# Patient Record
Sex: FEMALE | Race: WHITE | Employment: OTHER | URBAN - METROPOLITAN AREA
[De-identification: names, ages, dates, MRNs, and addresses within clinical notes are randomized per-mention and may not be internally consistent; named-entity substitution may affect disease eponyms.]

---

## 2017-06-10 ENCOUNTER — HOSPITAL ENCOUNTER (OUTPATIENT)
Facility: HOSPITAL | Age: 82
Setting detail: OBSERVATION
Discharge: NURSING, REHAB OR LONG-TERM CARE | End: 2017-06-13
Attending: EMERGENCY MEDICINE | Admitting: HOSPITALIST
Payer: MEDICARE

## 2017-06-10 ENCOUNTER — APPOINTMENT (OUTPATIENT)
Dept: CT IMAGING | Facility: HOSPITAL | Age: 82
End: 2017-06-10
Attending: EMERGENCY MEDICINE
Payer: MEDICARE

## 2017-06-10 DIAGNOSIS — G45.9 TRANSIENT CEREBRAL ISCHEMIA, UNSPECIFIED TYPE: Primary | ICD-10-CM

## 2017-06-10 PROCEDURE — 70450 CT HEAD/BRAIN W/O DYE: CPT | Performed by: EMERGENCY MEDICINE

## 2017-06-10 RX ORDER — ESCITALOPRAM OXALATE 5 MG/1
5 TABLET ORAL DAILY
COMMUNITY
End: 2017-08-05

## 2017-06-10 RX ORDER — LEVOTHYROXINE SODIUM 0.07 MG/1
75 TABLET ORAL
COMMUNITY

## 2017-06-10 RX ORDER — RALOXIFENE HYDROCHLORIDE 60 MG/1
60 TABLET, FILM COATED ORAL DAILY
COMMUNITY
End: 2017-08-05

## 2017-06-10 RX ORDER — ASPIRIN 325 MG
325 TABLET ORAL EVERY 6 HOURS PRN
COMMUNITY

## 2017-06-10 RX ORDER — ZOLPIDEM TARTRATE 5 MG/1
5 TABLET ORAL NIGHTLY PRN
COMMUNITY
End: 2017-06-27

## 2017-06-10 RX ORDER — MONTELUKAST SODIUM 10 MG/1
10 TABLET ORAL NIGHTLY
COMMUNITY
End: 2017-08-28

## 2017-06-10 RX ORDER — LISINOPRIL 10 MG/1
10 TABLET ORAL DAILY
COMMUNITY
End: 2017-08-05

## 2017-06-10 RX ORDER — HYDROCODONE BITARTRATE AND ACETAMINOPHEN 5; 325 MG/1; MG/1
1 TABLET ORAL EVERY 6 HOURS PRN
Status: ON HOLD | COMMUNITY
End: 2017-06-13

## 2017-06-10 RX ORDER — SIMVASTATIN 20 MG
20 TABLET ORAL NIGHTLY
COMMUNITY
End: 2017-08-07

## 2017-06-10 RX ORDER — ENOXAPARIN SODIUM 150 MG/ML
40 INJECTION SUBCUTANEOUS DAILY
COMMUNITY
End: 2017-08-28

## 2017-06-10 RX ORDER — OMEPRAZOLE 20 MG/1
20 CAPSULE, DELAYED RELEASE ORAL
COMMUNITY
End: 2017-08-05

## 2017-06-11 ENCOUNTER — APPOINTMENT (OUTPATIENT)
Dept: CV DIAGNOSTICS | Facility: HOSPITAL | Age: 82
End: 2017-06-11
Attending: HOSPITALIST
Payer: MEDICARE

## 2017-06-11 ENCOUNTER — APPOINTMENT (OUTPATIENT)
Dept: MRI IMAGING | Facility: HOSPITAL | Age: 82
End: 2017-06-11
Attending: HOSPITALIST
Payer: MEDICARE

## 2017-06-11 PROBLEM — E78.5 HYPERLIPIDEMIA: Chronic | Status: ACTIVE | Noted: 2017-06-11

## 2017-06-11 PROBLEM — E87.1 HYPONATREMIA: Status: ACTIVE | Noted: 2017-06-11

## 2017-06-11 PROBLEM — I25.10 CAD (CORONARY ARTERY DISEASE): Chronic | Status: ACTIVE | Noted: 2017-06-11

## 2017-06-11 PROBLEM — E03.9 HYPOTHYROIDISM: Status: ACTIVE | Noted: 2017-06-11

## 2017-06-11 PROBLEM — I10 HYPERTENSION: Chronic | Status: ACTIVE | Noted: 2017-06-11

## 2017-06-11 PROBLEM — G45.9 TRANSIENT CEREBRAL ISCHEMIA, UNSPECIFIED TYPE: Status: ACTIVE | Noted: 2017-06-11

## 2017-06-11 PROBLEM — G45.9 TRANSIENT CEREBRAL ISCHEMIA: Status: ACTIVE | Noted: 2017-06-11

## 2017-06-11 PROCEDURE — 99220 INITIAL OBSERVATION CARE,LEVL III: CPT | Performed by: HOSPITALIST

## 2017-06-11 PROCEDURE — 93306 TTE W/DOPPLER COMPLETE: CPT | Performed by: HOSPITALIST

## 2017-06-11 PROCEDURE — 99204 OFFICE O/P NEW MOD 45 MIN: CPT | Performed by: OTHER

## 2017-06-11 PROCEDURE — 70551 MRI BRAIN STEM W/O DYE: CPT | Performed by: HOSPITALIST

## 2017-06-11 RX ORDER — RALOXIFENE HYDROCHLORIDE 60 MG/1
60 TABLET, FILM COATED ORAL DAILY
Status: DISCONTINUED | OUTPATIENT
Start: 2017-06-11 | End: 2017-06-13

## 2017-06-11 RX ORDER — MORPHINE SULFATE 2 MG/ML
1 INJECTION, SOLUTION INTRAMUSCULAR; INTRAVENOUS EVERY 2 HOUR PRN
Status: DISCONTINUED | OUTPATIENT
Start: 2017-06-11 | End: 2017-06-11

## 2017-06-11 RX ORDER — CALCIUM CARBONATE 200(500)MG
2.5 TABLET,CHEWABLE ORAL 2 TIMES DAILY
COMMUNITY
End: 2017-08-28

## 2017-06-11 RX ORDER — ESCITALOPRAM OXALATE 5 MG/1
5 TABLET ORAL DAILY
Status: DISCONTINUED | OUTPATIENT
Start: 2017-06-11 | End: 2017-06-13

## 2017-06-11 RX ORDER — ASPIRIN 81 MG/1
324 TABLET, CHEWABLE ORAL ONCE
Status: COMPLETED | OUTPATIENT
Start: 2017-06-11 | End: 2017-06-11

## 2017-06-11 RX ORDER — PHENYLEPHRINE HCL IN 0.9% NACL 50MG/250ML
PLASTIC BAG, INJECTION (ML) INTRAVENOUS CONTINUOUS PRN
Status: DISCONTINUED | OUTPATIENT
Start: 2017-06-11 | End: 2017-06-11

## 2017-06-11 RX ORDER — ACETAMINOPHEN 650 MG/1
650 SUPPOSITORY RECTAL EVERY 4 HOURS PRN
Status: DISCONTINUED | OUTPATIENT
Start: 2017-06-11 | End: 2017-06-13

## 2017-06-11 RX ORDER — MAGNESIUM OXIDE 400 MG (241.3 MG MAGNESIUM) TABLET
3 TABLET NIGHTLY PRN
Status: DISCONTINUED | OUTPATIENT
Start: 2017-06-11 | End: 2017-06-13

## 2017-06-11 RX ORDER — ENOXAPARIN SODIUM 100 MG/ML
40 INJECTION SUBCUTANEOUS DAILY
Status: DISCONTINUED | OUTPATIENT
Start: 2017-06-11 | End: 2017-06-13

## 2017-06-11 RX ORDER — MORPHINE SULFATE 2 MG/ML
2 INJECTION, SOLUTION INTRAMUSCULAR; INTRAVENOUS EVERY 2 HOUR PRN
Status: DISCONTINUED | OUTPATIENT
Start: 2017-06-11 | End: 2017-06-11

## 2017-06-11 RX ORDER — PANTOPRAZOLE SODIUM 20 MG/1
20 TABLET, DELAYED RELEASE ORAL
Status: DISCONTINUED | OUTPATIENT
Start: 2017-06-11 | End: 2017-06-11

## 2017-06-11 RX ORDER — ASPIRIN 325 MG
325 TABLET ORAL DAILY
Status: DISCONTINUED | OUTPATIENT
Start: 2017-06-11 | End: 2017-06-13

## 2017-06-11 RX ORDER — MONTELUKAST SODIUM 10 MG/1
10 TABLET ORAL NIGHTLY
Status: DISCONTINUED | OUTPATIENT
Start: 2017-06-11 | End: 2017-06-13

## 2017-06-11 RX ORDER — LISINOPRIL 10 MG/1
10 TABLET ORAL DAILY
Status: DISCONTINUED | OUTPATIENT
Start: 2017-06-11 | End: 2017-06-13

## 2017-06-11 RX ORDER — FAMOTIDINE 20 MG/1
20 TABLET ORAL DAILY
Status: DISCONTINUED | OUTPATIENT
Start: 2017-06-11 | End: 2017-06-13

## 2017-06-11 RX ORDER — ASPIRIN 325 MG
325 TABLET ORAL DAILY
Status: DISCONTINUED | OUTPATIENT
Start: 2017-06-11 | End: 2017-06-11

## 2017-06-11 RX ORDER — HEPARIN SODIUM 5000 [USP'U]/ML
5000 INJECTION, SOLUTION INTRAVENOUS; SUBCUTANEOUS EVERY 8 HOURS SCHEDULED
Status: DISCONTINUED | OUTPATIENT
Start: 2017-06-11 | End: 2017-06-11

## 2017-06-11 RX ORDER — ENOXAPARIN SODIUM 100 MG/ML
30 INJECTION SUBCUTANEOUS DAILY
Status: DISCONTINUED | OUTPATIENT
Start: 2017-06-11 | End: 2017-06-11 | Stop reason: DRUGHIGH

## 2017-06-11 RX ORDER — ATORVASTATIN CALCIUM 80 MG/1
80 TABLET, FILM COATED ORAL NIGHTLY
Status: DISCONTINUED | OUTPATIENT
Start: 2017-06-11 | End: 2017-06-11

## 2017-06-11 RX ORDER — LABETALOL HYDROCHLORIDE 5 MG/ML
10 INJECTION, SOLUTION INTRAVENOUS EVERY 10 MIN PRN
Status: DISCONTINUED | OUTPATIENT
Start: 2017-06-11 | End: 2017-06-13

## 2017-06-11 RX ORDER — ONDANSETRON 2 MG/ML
4 INJECTION INTRAMUSCULAR; INTRAVENOUS EVERY 6 HOURS PRN
Status: DISCONTINUED | OUTPATIENT
Start: 2017-06-11 | End: 2017-06-13

## 2017-06-11 RX ORDER — SODIUM CHLORIDE 9 MG/ML
INJECTION, SOLUTION INTRAVENOUS CONTINUOUS
Status: ACTIVE | OUTPATIENT
Start: 2017-06-11 | End: 2017-06-13

## 2017-06-11 RX ORDER — LEVOTHYROXINE SODIUM 0.07 MG/1
75 TABLET ORAL
Status: DISCONTINUED | OUTPATIENT
Start: 2017-06-11 | End: 2017-06-13

## 2017-06-11 RX ORDER — ACETAMINOPHEN 325 MG/1
650 TABLET ORAL EVERY 4 HOURS PRN
COMMUNITY
End: 2017-08-28

## 2017-06-11 RX ORDER — FAMOTIDINE 10 MG/ML
20 INJECTION, SOLUTION INTRAVENOUS DAILY
Status: DISCONTINUED | OUTPATIENT
Start: 2017-06-11 | End: 2017-06-12

## 2017-06-11 RX ORDER — DOXEPIN HYDROCHLORIDE 50 MG/1
1 CAPSULE ORAL DAILY
COMMUNITY
End: 2017-11-02

## 2017-06-11 RX ORDER — ACETAMINOPHEN 325 MG/1
650 TABLET ORAL EVERY 4 HOURS PRN
Status: DISCONTINUED | OUTPATIENT
Start: 2017-06-11 | End: 2017-06-13

## 2017-06-11 RX ORDER — SENNOSIDES 8.6 MG
17.2 TABLET ORAL NIGHTLY
Status: DISCONTINUED | OUTPATIENT
Start: 2017-06-11 | End: 2017-06-13

## 2017-06-11 RX ORDER — ATORVASTATIN CALCIUM 10 MG/1
10 TABLET, FILM COATED ORAL NIGHTLY
Status: DISCONTINUED | OUTPATIENT
Start: 2017-06-11 | End: 2017-06-13

## 2017-06-11 RX ORDER — METOCLOPRAMIDE HYDROCHLORIDE 5 MG/ML
5 INJECTION INTRAMUSCULAR; INTRAVENOUS EVERY 8 HOURS PRN
Status: DISCONTINUED | OUTPATIENT
Start: 2017-06-11 | End: 2017-06-13

## 2017-06-11 RX ORDER — ASPIRIN 300 MG
300 SUPPOSITORY, RECTAL RECTAL DAILY
Status: DISCONTINUED | OUTPATIENT
Start: 2017-06-11 | End: 2017-06-13

## 2017-06-11 NOTE — PHYSICAL THERAPY NOTE
Received PT orders, chart review completed. Pt admitted for transient dizziness and unresponsiveness, placed on CVA order set upon admission. Per Ischemic CVA order set, Pt is on bedrest for 24 hours which from ED arrival which 21:04 tonight.  Will complete

## 2017-06-11 NOTE — SLP NOTE
ADULT SWALLOWING EVALUATION    ASSESSMENT & PLAN   ASSESSMENT  B/S swallow eval completed upon receipt of MD order. Per MD note  HPI  52-year-old woman presents with altered mental status.   Patient was at a wedding in around 65 or 6 PM was sitting in whe cerebral ischemia    Hypertension    Hyperlipidemia    Hyponatremia    Hypothyroidism    CAD (coronary artery disease)      Past Medical History  Past Medical History   Diagnosis Date   • TIA (transient ischemic attack)    • Stroke [facility])    • Atheroscleros complaints consistent with possible esophageal involvement    GOALS  Proceed with comm/cog eval pending MRI results.         FOLLOW UP  Treatment Plan: Communication evaluation (pending MRI results)     Follow Up Needed: Yes  SLP Follow-up Date: 06/12/17

## 2017-06-11 NOTE — ED NOTES
PCT 7 CTU WITH REPORT GIVEN TO AND ACCEPTED BY RENO SAMSON.  Twilla Kawasaki FOR TX TO 7622 PER CART VIA TRANSORT

## 2017-06-11 NOTE — CM/SW NOTE
TC to patient's daughter. Left voice mail explaining MOON form along with my contact information if she had any questions.

## 2017-06-11 NOTE — ED NOTES
RE-EVAL PER MD WITH DISCUSSION ON BOTH LABS/RADIOGRAPHICS. TO BE ADMITTED FOR OBSERVATION.  Valentino Pittman ANSWERED PER KP MAY.  IN AGREEMENT WITH POC

## 2017-06-11 NOTE — PROGRESS NOTES
EHR reviewed, pt examined.  NO complaints    GA: NAD  CV: RRR no m  Pulm: normal effort, no wheezes  Neuro: CN 2- 12 intact, neck is supple, MP 4/5 in LE, 4/5 in UE    Plan    - Continue home meds  - Neuro Cs  - Monitor Cr    Vishnu Boyle MD

## 2017-06-11 NOTE — ED PROVIDER NOTES
Patient Seen in: [facility] Emergency Department    History   Patient presents with:  Altered Mental Status (neurologic)    Stated Complaint:     HPI    26-year-old woman presents with altered mental status.   Patient was at a wedding in around 65 o hours.   Melatonin 3 MG Oral Cap,  Take 3 mg by mouth.   escitalopram 5 MG Oral Tab,  Take 5 mg by mouth daily. No family history on file.       Smoking Status: Former Smoker                   Packs/Day: 0.00  Years:           Quit date: 06/10/1999 Notable for the following:     Glucose 150 (*)     Creatinine 1.12 (*)     GFR 47 (*)     Albumin 3.4 (*)     Sodium 129 (*)     Chloride 96 (*)     CO2 19.0 (*)     All other components within normal limits   PROTHROMBIN TIME (PT) - Abnormal; Notable for (As transcribed by Technologist)  Dizziness and trouble finding words tonight. FINDINGS:  Mild global brain parenchymal volume loss without overt hydrocephalus. There is no midline shift or mass-effect. The basal cisterns are patent.   The gray-white mat

## 2017-06-11 NOTE — PLAN OF CARE
Admission Note. Admitted in stable condition. VS stable. SR per tele. Denies any pain. Neuro checks per stroke protocol. Will monitor.

## 2017-06-11 NOTE — PROGRESS NOTES
[facility] Pharmacy Note:  Renal Dose Adjustment for Enoxaparin (LOVENOX)    Jennyfer Guaman has been prescribed Enoxaparin (LOVENOX) 40 mg subcutaneously every  24 hours. Estimated Creatinine Clearance: 28.8 mL/min (based on Cr of 1.12).     Her calculated

## 2017-06-11 NOTE — PROGRESS NOTES
06/11/17 1253   Clinical Encounter Type   Visited With Patient not available   Continue Visiting Yes

## 2017-06-11 NOTE — PLAN OF CARE
Assumed pt care at 0700 for day shift. Upon initial assessment, pt vss, afebrile. NIH 1, did not know which month, stated it was September. No other deficits noted. RA, lung sounds clear in all fields. SR on tele.   0.9 NS infusing at 75 ml/hr per prot

## 2017-06-11 NOTE — CONSULTS
[facility] Pharmacy Note:  Renal Dose Adjustment for Enoxaparin (LOVENOX)    Edwina Spangler has been prescribed Enoxaparin (LOVENOX) 30 mg subcutaneously every 24 hours. Estimated Creatinine Clearance: 37 mL/min (based on Cr of 0.87).     Her calculated cre

## 2017-06-11 NOTE — CONSULTS
[id number] [facility] Initial Consultation    Diane Higginsl Patient Status:  Observation    10/27/1935 MRN [id number]   [facility] 7NE-A Attending Jo Ann Jefferson MD   Hosp Day # 1  [address]: coronary artery    • Disorder of thyroid    • Heart attack [facility])    • Asthma    • Esophageal reflux    • Depression    • Hearing impairment    • High blood pressure    • Osteoarthritis    • Coronary atherosclerosis    • High cholesterol        PAST SURGICA Rfl:     HYDROcodone-acetaminophen 5-325 MG Oral Tab Take 1 tablet by mouth every 6 (six) hours as needed for Pain. Disp:  Rfl:     Enoxaparin Sodium 150 MG/ML Subcutaneous Solution Inject 40 mg into the skin daily.    Disp:  Rfl:     Melatonin 3 MG Oral Ca EXAMINATION:  VITAL SIGNS: /59 mmHg  Pulse 70  Temp(Src) 98 °F (36.7 °C) (Oral)  Resp 20  Ht 60\"  Wt 145 lb  BMI 28.32 kg/m2  SpO2 96%    Gen: well developed, well nourished, no acute distress  HEENT: normocephalic  Heart; normal T2/U7, regular rate 06/11/2017   SPECGRAVITY 1.010 06/11/2017   GLUUR Negative 06/11/2017   BILUR Negative 06/11/2017   KETUR Negative 06/11/2017   BLOODURINE Trace-Intact 06/11/2017   PHURINE 5.5 06/11/2017   PROUR Negative 06/11/2017   UROBILINOGEN 0.2 06/11/2017   NITRITE

## 2017-06-11 NOTE — PROGRESS NOTES
[facility] Pharmacy Note:  Renal Dose Adjustment for Metoclopramide (REGLAN)    Kentrell Luna has been prescribed Metoclopramide (REGLAN) 10 mg every 8 hours as needed for nausea/vomiting. Estimated Creatinine Clearance: 28.8 mL/min (based on Cr of 1.12).

## 2017-06-11 NOTE — H&P
[facility] HOSPITALIST  History and Physical     Elenora Skiff Scripel Patient Status:  Observation    10/27/1935 MRN [id number]   [facility] 7NE-A Attending Santana Martínez [address] Day # 1  Bone Jackson     Chief Complaint: AMS    Hi Problem Relation Age of Onset   • Cancer Mother        Allergies:   Morphine                    Medications:    No current facility-administered medications on file prior to encounter. No current outpatient prescriptions on file prior to encounter.     R cardiac monitoring. Will place in her stroke protocol. Neurology on consult. Echocardiogram and MRI ordered. Will continue on aspirin and statin therapy. Will order EEG to rule out seizure  2. Hypertension-we will continue on lisinopril.   3. Hypothyro

## 2017-06-12 ENCOUNTER — APPOINTMENT (OUTPATIENT)
Dept: ULTRASOUND IMAGING | Facility: HOSPITAL | Age: 82
End: 2017-06-12
Attending: CLINICAL NURSE SPECIALIST
Payer: MEDICARE

## 2017-06-12 PROCEDURE — 99225 SUBSEQUENT OBSERVATION CARE: CPT | Performed by: STUDENT IN AN ORGANIZED HEALTH CARE EDUCATION/TRAINING PROGRAM

## 2017-06-12 PROCEDURE — 93880 EXTRACRANIAL BILAT STUDY: CPT | Performed by: CLINICAL NURSE SPECIALIST

## 2017-06-12 PROCEDURE — 99214 OFFICE O/P EST MOD 30 MIN: CPT | Performed by: OTHER

## 2017-06-12 NOTE — CM/SW NOTE
06/12/17 1500   CM/SW Referral Data   Referral Source Nurse;Family   Reason for Referral Discharge planning   Informant Other   Patient Info   Advanced directives?  Yes   Patient's Mental Status Alert;Oriented   Patient's Home Environment [facility]

## 2017-06-12 NOTE — PROGRESS NOTES
[id number] [facility] Neurology Progress Note    Ten Faulkner Patient Status:  Observation    10/27/1935 MRN [id number]   [facility] 7NE-A Attending Magen Wong, [address] [facility] Day # 2 PCP RADHA Smith         Subjective:  Ten Strickland President but unable to recall previous 2 Presidents, able to recall 1/3 items after 5 minutes, able to give months of year backwards without difficulty    Cranial Nerves   CN II, III: Visual fields full, Pupils equal and reactive, 3 mm brisk  CN III, IV, short term memory deficits, no current facial droop or further events of unresponsiveness. MRI negative for acute infarct. TIA work-up in process (CUS, ECHO pending). Continue home ASA and statin (on Lipitior while in hospital).  EEG pending to check for se

## 2017-06-12 NOTE — PROGRESS NOTES
06/12/17 1443   Clinical Encounter Type   Visited With Patient and family together   Referral From Family   Referral To    Patient Spiritual Encounters   Spiritual Needs Daughter wanted  to talk to patient about grief support and followi

## 2017-06-12 NOTE — PHYSICAL THERAPY NOTE
PHYSICAL THERAPY QUICK EVALUATION - INPATIENT    Room Number: 1027/6228-W  Evaluation Date: 6/12/2017  Presenting Problem: AMS, unresponsive episode, facial droop  Physician Order: PT Eval and Treat    Problem List  Principal Problem:    Transient cerebral Extremity: Other (Comment) (L heel WBing only per MD (daughter reports))    PAIN ASSESSMENT  Ratin         RANGE OF MOTION AND STRENGTH ASSESSMENT  Upper extremity ROM and strength are within functional limits 4+/5 throughout    Lower extremity ROM is factors, importance of regular physical activity, negative effects of immobility/risk for functional decline. Patient End of Session: Up in chair; With [address] [facility] [address] staff;Needs met;Call light within reach;RN aware of session/findings; All patient questions and co

## 2017-06-12 NOTE — PLAN OF CARE
AOx4 and appropriate. Neuro assessment unremarkable. Steady on feet with L ortho boot on. Denies dizziness when up. Orthostatic bp's documented below. Denies dysuria.   IV abx for uti.       06/11/17 2018 06/11/17 2020 06/11/17 2021   Vital Signs   P

## 2017-06-12 NOTE — CARDIAC REHAB
Consult received for stroke education. Education completed with patient. She has binder and is watching TIA video.

## 2017-06-12 NOTE — PROGRESS NOTES
[facility] Pharmacy Note:  Renal Adjustment for Ancef (cefazolin)    Arielle Whitehead is a [de-identified]year old female who has been prescribed Ancef (cefazolin) 1 gm every 8 hrs. CrCl is estimated creatinine clearance is 37 mL/min (based on Cr of 0.87).  so the dose has

## 2017-06-12 NOTE — PROCEDURES
[address]  [place], [address] [address]      PATIENT'S NAME: Ben YEE   ATTENDING PHYSICIAN: Marquis Montelongo.  Lissa Solomon MD   PATIENT ACCOUNT #: [de-identified] LOCATION: [address] [place] [address]   MEDICAL RECORD #: [id number] DATE OF BI

## 2017-06-12 NOTE — CONSULTS
[facility]    PATIENT'S NAME: Tsering Dill NAKIA   ATTENDING PHYSICIAN: Leeroy Cardenas MD   CONSULTING PHYSICIAN: Helga Sheikh Psy.D.   PATIENT ACCOUNT#:   [id number]    LOCATION:  [address] [place] [address]  MEDICAL RECORD #:   [id number]       DATE OF This is 1-1/2 standard deviations below the mean, again indicating mild impairment and deterioration. Certainly, there is functionality in recall after a 20-minute distraction.   Delayed recall on the Neurobehavioral Cognitive Status Examination again reve Mood is mildly depressed. DIAGNOSTIC IMPRESSION:    1. Major neurocognitive disorder, dementia, late onset Alzheimer disease with measurable memory loss and executive changes. 2.   Depressive disorder.     RECOMMENDATIONS:  The patient will benefit fr

## 2017-06-12 NOTE — PROGRESS NOTES
[facility]  Progress Note     Lacinda Aquas Scripel Patient Status:  Observation    10/27/1935 MRN [id number]   [facility] 7NE-A Attending Jo Ann Jefferson MD   Hosp Day # 2 PCP RADHA ZAMBRANO     Chief Complaint: AMS    S: Patient w/ no co Furoate-Vilanterol  1 puff Inhalation Daily   • Levothyroxine Sodium  75 mcg Oral Before breakfast   • lisinopril  10 mg Oral Daily   • Montelukast Sodium  10 mg Oral Nightly   • Raloxifene HCl  60 mg Oral Daily   • atorvastatin  10 mg Oral Nightly   • debra

## 2017-06-12 NOTE — SLP NOTE
SPEECH DAILY NOTE - INPATIENT    Evaluation Date: 06/12/2017    ASSESSMENT & PLAN   ASSESSMENT  Pt seen for dysphagia tx to assess tolerance with recommended diet, ensure appropriate utilization of aspiration precautions and provide pt/family education.   B

## 2017-06-13 VITALS
TEMPERATURE: 98 F | RESPIRATION RATE: 20 BRPM | OXYGEN SATURATION: 97 % | WEIGHT: 145 LBS | DIASTOLIC BLOOD PRESSURE: 61 MMHG | BODY MASS INDEX: 28.47 KG/M2 | HEIGHT: 60 IN | HEART RATE: 71 BPM | SYSTOLIC BLOOD PRESSURE: 140 MMHG

## 2017-06-13 PROCEDURE — 99214 OFFICE O/P EST MOD 30 MIN: CPT | Performed by: OTHER

## 2017-06-13 PROCEDURE — 99217 OBSERVATION CARE DISCHARGE: CPT | Performed by: STUDENT IN AN ORGANIZED HEALTH CARE EDUCATION/TRAINING PROGRAM

## 2017-06-13 RX ORDER — CEPHALEXIN 250 MG/1
250 CAPSULE ORAL 4 TIMES DAILY
Qty: 20 CAPSULE | Refills: 0 | Status: SHIPPED | OUTPATIENT
Start: 2017-06-13 | End: 2017-06-18

## 2017-06-13 RX ORDER — DONEPEZIL HYDROCHLORIDE 5 MG/1
5 TABLET, FILM COATED ORAL NIGHTLY
Status: DISCONTINUED | OUTPATIENT
Start: 2017-06-13 | End: 2017-06-13

## 2017-06-13 RX ORDER — DONEPEZIL HYDROCHLORIDE 5 MG/1
5 TABLET, FILM COATED ORAL NIGHTLY
Qty: 30 TABLET | Refills: 2 | Status: SHIPPED | OUTPATIENT
Start: 2017-06-13 | End: 2017-06-27

## 2017-06-13 NOTE — CM/SW NOTE
06/13/17 1400   Discharge disposition   Discharged to: Skilled Nurs   Name of [address]   Patient is Discharged to a [address] Yes   Discharge transportation Private car

## 2017-06-13 NOTE — PROGRESS NOTES
[id number] [facility] Neurology Progress Note    Alin Faulkner Patient Status:  Observation    10/27/1935 MRN [id number]   [facility] 7NE-A Attending Maricruz Ramirez MD   Hosp Day # 3 PCP RADHA Rausch         Subjective:  Alin Strickland recall previous 2 Presidents.     Cranial Nerves    CN II, III: Visual fields full, Pupils equal and reactive, 3 mm brisk  CN III, IV, VI: EOMI, no nystagmus  CN V: Facial sensation normal     CN VII: Symmetric facial movement    CN VIII: Eyak bilaterally (b echocardiography would     have greater sensitivity for the detection of cardiac source of embolism. Impressions:  No previous study was available for comparison. Assessment/Plan:  1.  Events of unresponsiveness and facial droop TIA vs vasovagal vs or fine,   Dementia of AD  appreciate neuro psych evaluation, dementia with late onset of AD, will do Aricept 5 mg qhs, side effect was discussed. She needs 24 hours supervision in DOL. Agreed with SNIF placement.            I have reviewed history and exami

## 2017-06-13 NOTE — PLAN OF CARE
Pt discharged to sub acute rehab. D/c paperwork sent with pt's son in law who is taking her there. Pt with no c/o and in no apparent distress. Discharged per wc per transport.

## 2017-06-13 NOTE — DISCHARGE SUMMARY
[facility] HOSPITALIST  DISCHARGE SUMMARY     Diane Higginsl Patient Status:  Observation    10/27/1935 MRN [id number]   [facility] 7NE-A Attending Jo Ann Jefferson MD   Hosp Day # 3 [address]     Date of Admission: 6/10/2017  Date of D hematemesis.  No blurred vision double vision or loss of vision, tinnitus, hearing changes.  No headache, sinus/nasal congestion, or cough.   Brief Synopsis:   Pt admitted to the hospital- evaluated by neurology and  Extensive work up was negative- see addison comparison.     Incidental or significant findings and recommendations (brief descriptions):  • no    Lab/Test results pending at Discharge:   · no    Consultants:  • Neuro, pneuropsych    Discharge Medication List:     Discharge Medications      START taki Levothyroxine Sodium 75 MCG Tabs   Last time this was given:  75 mcg on 6/13/2017  6:20 AM   Commonly known as:  SYNTHROID, LEVOTHROID        Take 75 mcg by mouth before breakfast.    Refills:  0       lisinopril 10 MG Tabs   Last time this was given: as soon as possible for a visit in 1 month  for dementia      Vital signs:  Temp:  [97.6 °F (36.4 °C)-98.2 °F (36.8 °C)] 98.2 °F (36.8 °C)  Pulse:  [68-83] 83  Resp:  [16-20] 20  BP: (128-154)/(48-67) 145/67 mmHg    Physical Exam:    General: No acute dist

## 2017-06-13 NOTE — CM/SW NOTE
Pt is ready for d/c today. She will return to [facility] in [place]. Pt's CHELSI Mendez will drive her to  today at 2:00pm.  Called and left message for the admissions director that pt will be d/c back to them today. AVS sent to  via ecin.   Gave

## 2017-06-13 NOTE — OCCUPATIONAL THERAPY NOTE
OCCUPATIONAL THERAPY EVALUATION - INPATIENT     Room Number: 0709/8830-Q  Evaluation Date: 6/13/2017  Type of Evaluation: Initial  Presenting Problem: AMS, facial droop    Physician Order: IP Consult to Occupational Therapy  Reason for Therapy: ADL/IADL Dy medication management, laundry prior to LE fracture. Since LE fracture pt has been at [facility] and has been min (A) for LB dressing, assist for bathing. Ambulates via RW and difficulty maintaining heel WB.        SUBJECTIVE  Pt states it's hard to remember to ke Little  -   Putting on and taking off regular upper body clothing?: A Little  -   Taking care of personal grooming such as brushing teeth?: A Little  -   Eating meals?: None    AM-PAC Score:  Score: 19  Approx Degree of Impairment: 42.8%  Standardized Scor areas of: bowel and bladder management, grooming, toileting, feeding, functional transfers, functional mobility, dressing, stairs and bathing.    In this OT evaluation patient presents with the following impairments: memory, maintenance of WB status, attent weight bearing status with no more than 3 cues for RW safety and WB status

## 2017-06-13 NOTE — PLAN OF CARE
Pt very alert this am. Oriented. Talking about how she ended up in hospital, what happened that she broke her foot recently, family, living situation. Report given to ecf RN.

## 2017-06-13 NOTE — PLAN OF CARE
Noncompliant with using call light. Bed alarm used. Not alert to time. Neuro assessment otherwise unremarkable. Denies dysuria or dizziness.       NEUROLOGICAL - ADULT    • Achieves stable or improved neurological status Progressing    • Achieves maxima

## 2017-06-27 ENCOUNTER — TELEPHONE (OUTPATIENT)
Dept: NEUROLOGY | Facility: CLINIC | Age: 82
End: 2017-06-27

## 2017-06-27 NOTE — PATIENT INSTRUCTIONS
Refill policies:    • Allow 2-3 business days for refills; controlled substances may take longer.   • Contact your pharmacy at least 5 days prior to running out of medication and have them send an electronic request or submit request through the [facility] have a procedure or additional testing performed. [facility]) will contact your insurance carrier to obtain pre-certification or prior authorization.     Unfortunately, [facility] has seen an increase in denial of payment even though the p

## 2017-06-27 NOTE — TELEPHONE ENCOUNTER
Spoke with Dr. Duc Bullock, to send consult note and Rx from today's visit to nursing facility. Faxed to number provided by daughter. Confirmation received.

## 2017-06-27 NOTE — PROGRESS NOTES
[facility] OUT[facility]SULTATION    Date of consult: 6/27/2017    CC: memory loss    HPI: Richie Mariee is a [de-identified]year old female with past medical history of HTN, HL, CAD, Strokes/TIA's who was admitted to [facility] on 6/10, she presented to the ED a (six) hours as needed for Pain., Disp: , Rfl:   •  Levothyroxine Sodium 75 MCG Oral Tab, Take 75 mcg by mouth before breakfast., Disp: , Rfl:   •  Montelukast Sodium 10 MG Oral Tab, Take 10 mg by mouth nightly., Disp: , Rfl:   •  omeprazole 20 MG Oral Caps S2+  Abdomen: soft, non tender, no masses  Extremities: no edema  Carotid bruits: no  Neurological Examination:  Orientation: Alert and oriented to person, place, time  Naming, repetition and comprehension intact  Memory: MMSE 22  Attention/concentration: [address]  6/27/2017, 10:03 Christy Delgado MD

## 2017-06-27 NOTE — TELEPHONE ENCOUNTER
Patient seen in office today with Dr. Jameson Mosley. Increased Aricept Rx. Is currently at a facility. Attempted to call daughter, Bernardo Funes (ok per HIPAA) to inquire name and number of facility so Rx can sent there. Mailbox was full, could not leave message.  Will

## 2017-06-27 NOTE — TELEPHONE ENCOUNTER
Daughter López Acosta (on HIPAA) provided facility information for Rx:      [facility]  Fax for 3rd Floor Nurse:  [phone]

## 2017-08-02 PROBLEM — M54.50 BACK PAIN AT L4-L5 LEVEL: Status: ACTIVE | Noted: 2017-08-02

## 2017-08-02 PROBLEM — Z91.81 AT RISK FOR FALLS: Status: ACTIVE | Noted: 2017-08-02

## 2017-08-28 PROBLEM — I95.1 ORTHOSTATIC HYPOTENSION: Status: ACTIVE | Noted: 2017-08-28

## 2017-11-02 PROBLEM — I48.0 PAROXYSMAL ATRIAL FIBRILLATION (HCC): Status: ACTIVE | Noted: 2017-11-02

## (undated) NOTE — IP AVS SNAPSHOT
[facility] [place] [address] [place], [address] ~ [phone]                Discharge Summary   6/10/2017    [address]           Admission Information        Provider Department    6/10/2017 Kaylyn Mccann MD  7ne-A Take 650 mg by mouth every 4 (four) hours as needed for Pain. aspirin 325 MG Tabs   Last time this was given:  325 mg on 6/13/2017 11:40 AM   Next dose due:  6/14        Take 325 mg by mouth every 6 (six) hours as needed for Pain. multivitamin Tabs   Notes to Patient:  Was not on while in hospital.         Take 1 tablet by mouth daily.                          omeprazole 20 MG Cpdr   Commonly known as:  PRILOSEC   Notes to Patient:  Was not on while in hospital.        Take 20 mg by (06/10/17)  14.0 (H) (06/10/17)  3.88 (06/10/17)  12.6 (06/10/17)  38.3 (06/10/17)  98.7 -- -- -- (06/10/17)  441.0 --      Recent Hematology Lab Results (cont.)  (Last 3 results in the past 90 days)    Neutrophil % Lymphocyte % Monocyte % Eosinophil % [address]. MyChart     Sign up for [employer], your secure online medical record. [employer] will allow you to access patient instructions from your recent visit,  view other health information, and more.  To sign up or find more information, and/or abnormal heart rates/rhythms   Most common side effects: Dizziness or feeling lightheaded (especially with standing), heart rate changes, headaches, nausea/vomiting   What to report to your healthcare team:  Dizziness, nausea, chest pain, weakness, Fluticasone Furoate-Vilanterol (BREO ELLIPTA) 200-25 MCG/INH Inhalation Aerosol Powder, Breath Activated    Montelukast Sodium 10 MG Oral Tab       Use:  Treatment of asthma, COPD/emphysema, cough, allergies   Most common side effects: Headache, nasal irr

## (undated) NOTE — IP AVS SNAPSHOT
Patient Demographics     Address Phone    [address]  [place] [address] [phone] [facility])      Emergency Contact(s)     Name Relation Home Work Mayank Daughter [phone]      Allergies as of 6/13/2017  Reviewed on: 6/ Next dose due: This evening with dinner then one more today at bedtime. Notes to Patient: This is treating a uti. Take 1 capsule (250 mg total) by mouth 4 (four) times daily.    Stop taking on:  6/18/2017    Antonio Richards Take 20 mg by mouth 2 (two) times daily before meals. Raloxifene HCl 60 MG Tabs   Last time this was given:  60 mg on 6/13/2017  2:44 PM   Commonly known as:  EVISTA   Next dose due:  6/14        Take 60 mg by mouth daily. Order ID Medication Name Action Time Action Reason Comments    [id number] Enoxaparin Sodium (LOVENOX) 40 MG/0.4ML injection 40 mg 06/13/17 1139 Given                    Recent Vital Signs       Most Recent Value    Vitals 140/61 mmHg Filed at 06/13/2017 13 was awakened back to her baseline. Patient has no recollection of the event. Patient remembers telling family that before this event she was not feeling well. Patient denies any fevers, chills, nausea, vomiting, diarrhea, constipation.   No chest pain, s HEENT: Normocephalic atraumatic. Moist mucous membranes. EOM-I. PERRLA. Anicteric. Neck: No lymphadenopathy. No JVD. No carotid bruits. Respiratory: Clear to auscultation bilaterally. No wheezes. No rhonchi.   Cardiovascular: S1, S2. Regular rate and rhyt recheck sodium in the morning. 8. Acute renal insufficiency-secondary to dehydration.   Will continue gentle IV fluids and recheck kidney function      Quality:  · DVT Prophylaxis: Lovenox  · CODE status: Full  · Bruno: None    Plan of care discussed with feeling of being \"unwell. \"  When asked, she denies preceding aura of bad taste, smell or epigastric nausea.     Family in room states she also prior to being in the reception this day, when out in the parking lot, had momentary period where she was not ab (two) times daily. Dose 1250 mg BID Disp:  Rfl:     multivitamin Oral Tab Take 1 tablet by mouth daily.  Disp:  Rfl:     Fluticasone Furoate-Vilanterol (BREO ELLIPTA) 200-25 MCG/INH Inhalation Aerosol Powder, Breath Activated Inhale 1 puff into the lungs da Or      Metoclopramide HCl (REGLAN) injection 5 mg 5 mg Intravenous Q8H PRN   famoTIDine (PEPCID) tab 20 mg 20 mg Oral Daily   Or      famoTIDine (PEPCID) injection 20 mg 20 mg Intravenous Daily   escitalopram (LEXAPRO) tablet 5 mg 5 mg Oral Daily   Flutic assess left foot as in walking boot  DTR: 2+ symmetric throughout UE and LE patellar; absent ankle jerks bilaterally, toes downgoing bilaterally, no clonus  Sensory: intact to light touch, pinprick, vibration and proprioception symmetrically  Coord: FNF an 1. Episode of unresponsiveness and facial droop:     Differential includes TIA vs stroke vs seizure; has more risk factors for TIA / stroke - MRI done and negative for stroke; differential neurologically includes TIA vs seizure; may also be cardiac or vert TCM Diagnosis at discharge from Hospital: Other: Dementia; still recommend for TCM follow-up    Please note that only patients enrolled in the Medicare ACO, [facility] ACO and [address] [place] [address] will be handled by a member of the Care Management Team.  For all other ray 1-15% stenosis on the right. 16-49% stenosis on the left.     6/12 EEG:  IMPRESSION:   This study is within the limits of normal range for patient's age.  There is no obvious epileptiform activity identified in this recording.  However, a normal EEG record Quantity:  20 capsule   Refills:  0       Donepezil HCl 5 MG Tabs   Commonly known as:  ARICEPT        Take 1 tablet (5 mg total) by mouth nightly.     Quantity:  30 tablet   Refills:  2         CONTINUE taking these medications       Instructions Prescript Last time this was given:  10 mg on 6/12/2017  8:15 PM   Commonly known as:  SINGULAIR        Take 10 mg by mouth nightly. Refills:  0       multivitamin Tabs   Notes to Patient:  Was not on while in hospital.         Take 1 tablet by mouth daily.     Re or guarding. Neurologic: No focal neurological deficits. Musculoskeletal: Moves all extremities. Extremities: No edema.   -----------------------------------------------------------------------------------------------  PATIENT DISCHARGE INSTRUCTIONS: Se Dementia, late onset alzheimers     History of Present Illness:     All Christianson is a [de-identified]year old female with history of coronary artery disease, history of hyperlipidemia presents emergency room with an episode of unresponsiveness that occurred this    abnormalities. Doppler parameters are consistent with abnormal left     ventricular relaxation - grade 1 diastolic dysfunction. 2. Aortic valve: Mildly calcified annulus. Trileaflet; mildly thickened     leaflets.   3. Mitral valve: Mildly calcified tanya FILIPEO ELLIPTA 200-25 MCG/INH Aepb   Last time this was given:  1 puff on 6/13/2017 11:40 AM   Generic drug:  Fluticasone Furoate-Vilanterol        Inhale 1 puff into the lungs daily.     Refills:  0       Calcium Carbonate Antacid 500 MG Chew   Commonly kno Take 60 mg by mouth daily. Refills:  0       simvastatin 20 MG Tabs   Commonly known as:  ZOCOR        Take 20 mg by mouth nightly.     Refills:  0       Zolpidem Tartrate 5 MG Tabs   Commonly known as:  AMBIEN        Take 5 mg by mouth nightly as neede Physical Therapy Note by Yaneth Dixon PT at 6/12/2017  3:11 PM  Version 1 of 1    Author:  Yaneth Dixon PT Service:  (none) Author Type:  Physical Therapist    Filed:  6/12/2017  3:22 PM Note Time:  6/12/2017  3:11 PM Status:  Signed    :  Adi report. Prior to fall 3 weeks ago, patient was independent c ADLs and ambulation living in ILF.     SUBJECTIVE  \"I didn't know not to have alcohol with my norco.\"    OBJECTIVE  Precautions: Bed/chair alarm (L CAM walking boot)  Fall Risk: High fall risk presents with L boot donned. Pt is able to complete supine<> sit transfer at Mod I. Pt is able to complete sit<>stand c supervision and cues to maintain heel WBing precautions on L LE.  Pt is able to ambulate 3 ft using RW and supervision from bed to bedsid Physical Therapy Note by Primo Keita, PT at 6/11/2017  1:08 PM  Version 1 of 1    Author:  Primo Keita PT Service:  Rehab Author Type:  Physical Therapist    Filed:  6/11/2017  1:09 PM Note Time:  6/11/2017  1:08 PM Status:  Signed    Edit Principal Problem:    Transient cerebral ischemia, unspecified type  Active Problems:    Transient cerebral ischemia    Hypertension    Hyperlipidemia    Hyponatremia    Hypothyroidism    CAD (coronary artery disease)    Acute cystitis without hematuria Safety Judgement:  decreased awareness of need for assistance and decreased awareness of need for safety  Awareness of Deficits:  decreased awareness of deficits    VISION  Current Vision: no changes    PERCEPTION  Overall Perception Status:   WFL - within precaution. Poor RW safety and maintenance of WB status during hand washing at sink. Transfer to EOB with SBA. Reinforcement of education on WB status provided. Sit to stand completed x5 at EOB with cueing for impulsivity, sequencing, body mechanics.  Pt ab address the above deficits, maximizing patient's ability to return to prior level of function. Pt will require 24 hour supervision upon discharge from the hospital due to difficulty maintaining WB precaution.  Pt will benefit from [facility] OT to maximize safety d appropriate utilization of aspiration precautions and provide pt/family education.   Bedside swallow evaluation completed 6/11/17 revealed swallow mechanism to be [facility] and recommended regular diet with thin liquids and communication evaluation pending result